# Patient Record
Sex: FEMALE | Race: WHITE | NOT HISPANIC OR LATINO | Employment: UNEMPLOYED | URBAN - NONMETROPOLITAN AREA
[De-identification: names, ages, dates, MRNs, and addresses within clinical notes are randomized per-mention and may not be internally consistent; named-entity substitution may affect disease eponyms.]

---

## 2021-02-09 ENCOUNTER — IMMUNIZATION (OUTPATIENT)
Dept: VACCINE CLINIC | Facility: HOSPITAL | Age: 66
End: 2021-02-09

## 2021-02-09 PROCEDURE — 0001A: CPT | Performed by: THORACIC SURGERY (CARDIOTHORACIC VASCULAR SURGERY)

## 2021-02-09 PROCEDURE — 91300 HC SARSCOV02 VAC 30MCG/0.3ML IM: CPT | Performed by: THORACIC SURGERY (CARDIOTHORACIC VASCULAR SURGERY)

## 2021-03-02 ENCOUNTER — IMMUNIZATION (OUTPATIENT)
Dept: VACCINE CLINIC | Facility: HOSPITAL | Age: 66
End: 2021-03-02

## 2021-03-02 PROCEDURE — 0002A: CPT | Performed by: THORACIC SURGERY (CARDIOTHORACIC VASCULAR SURGERY)

## 2021-03-02 PROCEDURE — 91300 HC SARSCOV02 VAC 30MCG/0.3ML IM: CPT | Performed by: THORACIC SURGERY (CARDIOTHORACIC VASCULAR SURGERY)

## 2024-08-02 ENCOUNTER — APPOINTMENT (OUTPATIENT)
Dept: GENERAL RADIOLOGY | Facility: HOSPITAL | Age: 69
End: 2024-08-02
Payer: MEDICARE

## 2024-08-02 ENCOUNTER — HOSPITAL ENCOUNTER (EMERGENCY)
Facility: HOSPITAL | Age: 69
Discharge: HOME | End: 2024-08-02
Attending: EMERGENCY MEDICINE
Payer: MEDICARE

## 2024-08-02 VITALS
TEMPERATURE: 98 F | HEIGHT: 65 IN | WEIGHT: 188 LBS | OXYGEN SATURATION: 92 % | BODY MASS INDEX: 31.32 KG/M2 | HEART RATE: 78 BPM | SYSTOLIC BLOOD PRESSURE: 104 MMHG | RESPIRATION RATE: 17 BRPM | DIASTOLIC BLOOD PRESSURE: 74 MMHG

## 2024-08-02 DIAGNOSIS — R42 VERTIGO: Primary | ICD-10-CM

## 2024-08-02 DIAGNOSIS — I48.20 CHRONIC ATRIAL FIBRILLATION: ICD-10-CM

## 2024-08-02 LAB
ALBUMIN SERPL-MCNC: 4.1 G/DL (ref 3.5–5.2)
ALBUMIN/GLOB SERPL: 1.2 G/DL
ALP SERPL-CCNC: 102 U/L (ref 39–117)
ALT SERPL W P-5'-P-CCNC: 13 U/L (ref 1–33)
ANION GAP SERPL CALCULATED.3IONS-SCNC: 10 MMOL/L (ref 5–15)
AST SERPL-CCNC: 22 U/L (ref 1–32)
BACTERIA UR QL AUTO: NORMAL /HPF
BASOPHILS # BLD AUTO: 0.07 10*3/MM3 (ref 0–0.2)
BASOPHILS NFR BLD AUTO: 1 % (ref 0–1.5)
BILIRUB SERPL-MCNC: 0.5 MG/DL (ref 0–1.2)
BILIRUB UR QL STRIP: NEGATIVE
BUN SERPL-MCNC: 17 MG/DL (ref 8–23)
BUN/CREAT SERPL: 26.6 (ref 7–25)
CALCIUM SPEC-SCNC: 9 MG/DL (ref 8.6–10.5)
CHLORIDE SERPL-SCNC: 103 MMOL/L (ref 98–107)
CLARITY UR: CLEAR
CO2 SERPL-SCNC: 28 MMOL/L (ref 22–29)
COLOR UR: YELLOW
CREAT SERPL-MCNC: 0.64 MG/DL (ref 0.57–1)
DEPRECATED RDW RBC AUTO: 45.9 FL (ref 37–54)
EGFRCR SERPLBLD CKD-EPI 2021: 94.6 ML/MIN/1.73
EOSINOPHIL # BLD AUTO: 0.15 10*3/MM3 (ref 0–0.4)
EOSINOPHIL NFR BLD AUTO: 2.2 % (ref 0.3–6.2)
ERYTHROCYTE [DISTWIDTH] IN BLOOD BY AUTOMATED COUNT: 13.5 % (ref 12.3–15.4)
GLOBULIN UR ELPH-MCNC: 3.3 GM/DL
GLUCOSE SERPL-MCNC: 117 MG/DL (ref 65–99)
GLUCOSE UR STRIP-MCNC: NEGATIVE MG/DL
HCT VFR BLD AUTO: 46.7 % (ref 34–46.6)
HGB BLD-MCNC: 15.2 G/DL (ref 12–15.9)
HGB UR QL STRIP.AUTO: NEGATIVE
HOLD SPECIMEN: NORMAL
HYALINE CASTS UR QL AUTO: NORMAL /LPF
IMM GRANULOCYTES # BLD AUTO: 0.02 10*3/MM3 (ref 0–0.05)
IMM GRANULOCYTES NFR BLD AUTO: 0.3 % (ref 0–0.5)
KETONES UR QL STRIP: NEGATIVE
LEUKOCYTE ESTERASE UR QL STRIP.AUTO: ABNORMAL
LYMPHOCYTES # BLD AUTO: 1.15 10*3/MM3 (ref 0.7–3.1)
LYMPHOCYTES NFR BLD AUTO: 17.2 % (ref 19.6–45.3)
MAGNESIUM SERPL-MCNC: 2.2 MG/DL (ref 1.6–2.4)
MCH RBC QN AUTO: 29.6 PG (ref 26.6–33)
MCHC RBC AUTO-ENTMCNC: 32.5 G/DL (ref 31.5–35.7)
MCV RBC AUTO: 90.9 FL (ref 79–97)
MONOCYTES # BLD AUTO: 0.44 10*3/MM3 (ref 0.1–0.9)
MONOCYTES NFR BLD AUTO: 6.6 % (ref 5–12)
NEUTROPHILS NFR BLD AUTO: 4.86 10*3/MM3 (ref 1.7–7)
NEUTROPHILS NFR BLD AUTO: 72.7 % (ref 42.7–76)
NITRITE UR QL STRIP: NEGATIVE
NRBC BLD AUTO-RTO: 0 /100 WBC (ref 0–0.2)
PH UR STRIP.AUTO: 6 [PH] (ref 5–8)
PLATELET # BLD AUTO: 311 10*3/MM3 (ref 140–450)
PMV BLD AUTO: 10.5 FL (ref 6–12)
POTASSIUM SERPL-SCNC: 3.7 MMOL/L (ref 3.5–5.2)
PROT SERPL-MCNC: 7.4 G/DL (ref 6–8.5)
PROT UR QL STRIP: NEGATIVE
QT INTERVAL: 360 MS
QTC INTERVAL: 438 MS
RBC # BLD AUTO: 5.14 10*6/MM3 (ref 3.77–5.28)
RBC # UR STRIP: NORMAL /HPF
REF LAB TEST METHOD: NORMAL
SODIUM SERPL-SCNC: 141 MMOL/L (ref 136–145)
SP GR UR STRIP: 1.02 (ref 1–1.03)
SQUAMOUS #/AREA URNS HPF: NORMAL /HPF
TROPONIN T SERPL HS-MCNC: <6 NG/L
TROPONIN T SERPL HS-MCNC: <6 NG/L
UROBILINOGEN UR QL STRIP: ABNORMAL
WBC # UR STRIP: NORMAL /HPF
WBC NRBC COR # BLD AUTO: 6.69 10*3/MM3 (ref 3.4–10.8)
WHOLE BLOOD HOLD COAG: NORMAL
WHOLE BLOOD HOLD SPECIMEN: NORMAL

## 2024-08-02 PROCEDURE — 36415 COLL VENOUS BLD VENIPUNCTURE: CPT

## 2024-08-02 PROCEDURE — 84484 ASSAY OF TROPONIN QUANT: CPT | Performed by: EMERGENCY MEDICINE

## 2024-08-02 PROCEDURE — 80053 COMPREHEN METABOLIC PANEL: CPT | Performed by: EMERGENCY MEDICINE

## 2024-08-02 PROCEDURE — 99284 EMERGENCY DEPT VISIT MOD MDM: CPT

## 2024-08-02 PROCEDURE — 85025 COMPLETE CBC W/AUTO DIFF WBC: CPT | Performed by: EMERGENCY MEDICINE

## 2024-08-02 PROCEDURE — 81001 URINALYSIS AUTO W/SCOPE: CPT | Performed by: EMERGENCY MEDICINE

## 2024-08-02 PROCEDURE — 93005 ELECTROCARDIOGRAM TRACING: CPT | Performed by: EMERGENCY MEDICINE

## 2024-08-02 PROCEDURE — 71045 X-RAY EXAM CHEST 1 VIEW: CPT

## 2024-08-02 PROCEDURE — 83735 ASSAY OF MAGNESIUM: CPT | Performed by: EMERGENCY MEDICINE

## 2024-08-02 RX ORDER — MELOXICAM 15 MG/1
15 TABLET ORAL DAILY
Qty: 10 TABLET | Refills: 0 | Status: SHIPPED | OUTPATIENT
Start: 2024-08-02

## 2024-08-02 RX ORDER — ACETAMINOPHEN 500 MG
1000 TABLET ORAL EVERY 6 HOURS PRN
Qty: 30 TABLET | Refills: 0 | Status: SHIPPED | OUTPATIENT
Start: 2024-08-02

## 2024-08-02 RX ORDER — SODIUM CHLORIDE 0.9 % (FLUSH) 0.9 %
10 SYRINGE (ML) INJECTION AS NEEDED
Status: DISCONTINUED | OUTPATIENT
Start: 2024-08-02 | End: 2024-08-02 | Stop reason: HOSPADM

## 2024-08-02 NOTE — ED PROVIDER NOTES
"Subjective   History of Present Illness  Patient is a 69-year-old female presenting to the emergency department with an episode of significant vertigo with some chest tightness and palpitations.  Patient reports she has a history of atrial fibrillation and is currently on Eliquis and reports she is compliant with the medication.  She reports she went and saw her cardiologist last week and they advised that she was \"out of rhythm\".  Patient wore a monitor and returned it yesterday.  Patient reports that yesterday she noted that she was in A-fib.  Patient took her medications with improvement in the symptoms.  Patient reports about 7 AM this morning she got up to use the restroom and had \"severe vertigo\".  She reports that she had chest tightness and palpitations at that time as well.  Patient reports the dizziness has improved significantly.    History provided by:  Patient and spouse      Review of Systems    No past medical history on file.    Allergies   Allergen Reactions    Novocain [Procaine] Swelling       No past surgical history on file.    No family history on file.    Social History     Socioeconomic History    Marital status:            Objective   Physical Exam  Vitals and nursing note reviewed.   Constitutional:       General: She is not in acute distress.     Appearance: Normal appearance. She is obese. She is toxic-appearing.   Cardiovascular:      Rate and Rhythm: Normal rate. Rhythm irregularly irregular.   Pulmonary:      Effort: Pulmonary effort is normal. No respiratory distress.      Breath sounds: Normal breath sounds.   Abdominal:      Palpations: Abdomen is soft.   Musculoskeletal:         General: Normal range of motion.      Right lower leg: No edema.      Left lower leg: No edema.   Neurological:      General: No focal deficit present.      Mental Status: She is alert and oriented to person, place, and time.      Comments: HINTS exam not suggestive of central etiology. No unilateral " deficit.    Psychiatric:         Mood and Affect: Mood normal.         Behavior: Behavior normal.         Procedures           ED Course  ED Course as of 08/02/24 1853   Fri Aug 02, 2024   1037 XR Chest 1 View  I personally reviewed single view the chest.  My interpretation there is no focal lobar infiltrate visualized [RS]   1039 I did review the cardiology visit note from July 16.  It is noted at that point that the patient was in paroxysmal atrial fibrillation with irregularly irregular rate and rhythm.  Please see that note for further details. [RS]   1126 Single High Sensitivity Troponin T  Troponin is negative. [RS]   1127 Cardiology paged. [RS]   1155 I reviewed the case with cardiology.  He feels that if the patient has been in atrial fibrillation with a controlled rate at this point, he would not recommend cardioversion at this time.  He would continue the evaluation and plan for discharge with follow-up with her cardiologist. [RS]   1456 Patient is resting comfortably.  I once again reviewed the second EKG with cardiology and they continue to recommend the plan.  I talked with the patient and she would like to establish with our A-fib clinic and cardiology.  Patient with findings consistent with chronic atrial fibrillation with episode of vertigo which could be related to the atrial fibrillation or be unrelated.  Patient symptoms have resolved.  No stroke deficit appreciated. I have reviewed results, considerations, and diagnosis with the patient and/or their representative. Anticipatory guidance provided. Follow-up plan reviewed. Precautions for acute return for re-evaluations also reviewed. This including potential for worsening of the presenting condition and need for further evaluation, admission, and/or intervention as indicated. Opportunity to as questions provided. I advised them to return for any concerns and stressed the importance of timely follow-up and outpatient services. They verbalized  understanding.   [RS]   0396 Note to patient: The 21st Century Cures Act makes medical notes like these available to patients in the interest of transparency. However, be advised this is a medical document. It is intended as peer to peer communication. It is written in medical language and may contain abbreviations or verbiage that are unfamiliar. It may appear blunt or direct. Medical documents are intended to carry relevant information, facts as evident, and the clinical opinion of the physician/NPP.   [RS]      ED Course User Index  [RS] Darshan Pritchard MD                                             Medical Decision Making  Problems Addressed:  Chronic atrial fibrillation: complicated acute illness or injury  Vertigo: complicated acute illness or injury    Amount and/or Complexity of Data Reviewed  Independent Historian: spouse and EMS  Labs: ordered. Decision-making details documented in ED Course.  Radiology: ordered. Decision-making details documented in ED Course.  ECG/medicine tests: ordered.    Risk  OTC drugs.  Prescription drug management.        Final diagnoses:   Vertigo   Chronic atrial fibrillation       ED Disposition  ED Disposition       ED Disposition   Discharge    Condition   Stable    Comment   --               [facility]  [address]  [place] [address]  [phone]  Schedule an appointment as soon as possible for a visit   As soon as possible.    Keerthi Koenig, PA  [address] [place] [address]   [address]  [facility] [address] [phone]          [facility] EMERGENCY DEPARTMENT  [address] [facility] [id number] [phone]    As needed, If symptoms worsen or ANY concerns.    [facility]  [address] [facility]  [address]  [facility] [id number] [phone]             Medication List        New Prescriptions      acetaminophen 500 MG tablet  Commonly known as: TYLENOL  Take 2 tablets  by mouth Every 6 (Six) Hours As Needed for Mild Pain or Moderate Pain.     meloxicam 15 MG tablet  Commonly known as: MOBIC  Take 1 tablet by mouth Daily.               Where to Get Your Medications        These medications were sent to [facility] [id number] - [place] - [address] - [phone]  - [phone] FX  [address] [place] [address]      Phone: [phone]   acetaminophen 500 MG tablet  meloxicam 15 MG tablet            Darshan Pritchard MD  08/02/24 5722

## 2024-08-05 LAB
QT INTERVAL: 394 MS
QTC INTERVAL: 451 MS

## 2024-08-08 ENCOUNTER — OFFICE VISIT (OUTPATIENT)
Dept: CARDIOLOGY | Facility: HOSPITAL | Age: 69
End: 2024-08-08
Payer: MEDICARE

## 2024-08-08 VITALS
DIASTOLIC BLOOD PRESSURE: 62 MMHG | RESPIRATION RATE: 16 BRPM | BODY MASS INDEX: 30.19 KG/M2 | SYSTOLIC BLOOD PRESSURE: 128 MMHG | TEMPERATURE: 97.3 F | HEIGHT: 65 IN | WEIGHT: 181.2 LBS | HEART RATE: 62 BPM | OXYGEN SATURATION: 94 %

## 2024-08-08 DIAGNOSIS — I48.0 AF (PAROXYSMAL ATRIAL FIBRILLATION): Primary | ICD-10-CM

## 2024-08-08 DIAGNOSIS — I10 PRIMARY HYPERTENSION: ICD-10-CM

## 2024-08-08 RX ORDER — DIPHENOXYLATE HYDROCHLORIDE AND ATROPINE SULFATE 2.5; .025 MG/1; MG/1
1 TABLET ORAL DAILY
COMMUNITY

## 2024-08-08 RX ORDER — UBIDECARENONE 200 MG
200 CAPSULE ORAL DAILY
COMMUNITY

## 2024-08-08 RX ORDER — FLECAINIDE ACETATE 100 MG/1
100 TABLET ORAL EVERY 12 HOURS
COMMUNITY
Start: 2024-03-07 | End: 2024-09-03

## 2024-08-08 RX ORDER — SIMVASTATIN 20 MG
1 TABLET ORAL DAILY
COMMUNITY

## 2024-08-08 RX ORDER — MONTELUKAST SODIUM 10 MG/1
1 TABLET ORAL DAILY
COMMUNITY

## 2024-08-08 RX ORDER — HYDROCHLOROTHIAZIDE 12.5 MG/1
1 TABLET ORAL DAILY
COMMUNITY

## 2024-08-08 RX ORDER — CETIRIZINE HYDROCHLORIDE 10 MG/1
CAPSULE, LIQUID FILLED ORAL DAILY
COMMUNITY

## 2024-08-08 RX ORDER — APIXABAN 5 MG/1
1 TABLET, FILM COATED ORAL 2 TIMES DAILY
COMMUNITY

## 2024-08-08 NOTE — PATIENT INSTRUCTIONS
- Office will schedule testing  - Office will call or send message with testing results    - Dr. Miller's department will call for appointment    - Research atrial fibrillation ablation

## 2024-08-08 NOTE — PROGRESS NOTES
Chief Complaint  Atrial Fibrillation    Subjective      History of Present Illness {  Problem List  Visit  Diagnosis   Encounters  Notes  Medications  Labs  Result Review Imaging  Media :23}     Kailey Nguyen, 69 y.o. female with paroxysmal atrial fibrillation, HTN, HLD, vasculitis, asthma presents to [facility] for Atrial Fibrillation.    Patient was recently evaluated at [facility] emergency department for complaints of vertigo, as well as chest tightness and palpitations.  Emergency department work-up revealed normal troponin, CXR with no acute cardiopulmonary findings, and ECGs with no evidence of acute coronary syndrome. Patient was instructed to follow-up at [facility] heart and valve clinic for further evaluation.  Patient recently had an appointment with  electrophysiology and was placed on a 2-week monitor with plans for potential cardioversion if atrial fibrillation was persistent.    Care everywhere results indicate Holter monitor was completed for approximately 14 days.  Atrial fibrillation/flutter burden 22%.  Average heart rate 62.  PACs/PVC burden less than 1%.    Patient presents today with no recurrent dizziness since ED evaluation. She experienced severe dizziness and violent loss of balance after getting out of bed in the morning; no syncope or falls. Did not feel like she was in atrial fibrillation at that time. Reports she is generally aware of her atrial fibrillation with symptoms of fatigue/shortness of breath/chest pressure. No recent illness, but has been struggling with allergy/sinus issues lately. Denies anginal symptoms. Does report intermittent vasovagal symptoms; recently increased hydration. Currently maintained on metoprolol and flecainide.       Objective     Vital Signs:   Vitals:    08/08/24 0820 08/08/24 0821   BP: 134/74 128/62   BP Location: Left arm Left arm   Patient Position: Standing Sitting   Cuff Size: Adult Adult  "  Pulse: 70 62   Resp:  16   Temp: 97.3 °F (36.3 °C) 97.3 °F (36.3 °C)   TempSrc: Temporal Temporal   SpO2: 95% 94%   Weight:  82.2 kg (181 lb 3.2 oz)   Height:  165.1 cm (65\")     Body mass index is 30.15 kg/m².  Physical Exam  Vitals and nursing note reviewed.   Constitutional:       Appearance: Normal appearance.   HENT:      Head: Normocephalic.   Eyes:      Extraocular Movements: Extraocular movements intact.   Neck:      Vascular: No carotid bruit.   Cardiovascular:      Rate and Rhythm: Normal rate and regular rhythm.      Pulses: Normal pulses.      Heart sounds: Normal heart sounds, S1 normal and S2 normal. No murmur heard.  Pulmonary:      Effort: Pulmonary effort is normal. No respiratory distress.      Breath sounds: Normal breath sounds.   Musculoskeletal:      Cervical back: Neck supple.      Right lower leg: No edema.      Left lower leg: No edema.   Skin:     General: Skin is warm and dry.   Neurological:      General: No focal deficit present.      Mental Status: She is alert.   Psychiatric:         Mood and Affect: Mood normal.         Behavior: Behavior normal.         Thought Content: Thought content normal.        Data Reviewed:{ Labs  Result Review  Imaging  Med Tab  Media :23}     Single High Sensitivity Troponin T (08/02/2024 13:11)  Magnesium (08/02/2024 10:36)  Single High Sensitivity Troponin T (08/02/2024 10:36)  Comprehensive Metabolic Panel (08/02/2024 10:36)  CBC & Differential (08/02/2024 10:36)  XR Chest 1 View (08/02/2024 10:28)   ECG 12 Lead Rhythm Change (08/02/2024 13:09)  ECG 12 Lead ED Triage Standing Order; Weak / Dizzy / AMS (08/02/2024 10:08)    Holter Monitor - 72 Hour Up To 15 Days (07/16/2024 11:49)   Adult Transthoracic Echo Complete W/ Cont if Necessary Per Protocol (07/10/2018 11:38)   CT Angiogram Neck (10/30/2018 16:20)       Assessment & Plan   Assessment and Plan {CC Problem List  Visit Diagnosis  ROS  Review (Popup)  Health Maintenance  Quality  " BestPractice  Medications  SmartSets  SnapShot Encounters  Media :23}     1. AF (paroxysmal atrial fibrillation)  - History of paroxysmal AF dating back 2019  -Recent ED evaluation with pronounced dizziness, found in rate controlled atrial flutter.  -Recent Holter monitor at  completed for approximately 14 days.  Atrial fibrillation/flutter burden 22%.  Average heart rate 62.  PACs/PVC burden less than 1%.  -Regular rate/rhythm at time of exam. Recenty ECG with stable QRS on flecainide  -[id number]: 3 (age, female, HTN)  - Adult Transthoracic Echo Complete W/ Cont if Necessary Per Protocol; Future  -Continue flecainide/metoprolol for now. Discussed may take additional metoprolol dose for elevated heart rate/symptoms of AF.   -Lengthy discussion on antiarrhythmic transition versus ablation.  She will research and think about options before meeting up with [facility].  -Would like to transition to [facility] as her primary EP at  is leaving [place]  -Referral to [facility]    2. Primary hypertension  -Well-controlled today  -Continue current medication regimen  - Adult Transthoracic Echo Complete W/ Cont if Necessary Per Protocol; Future      Follow Up {Instructions Charge Capture  Follow-up Communications :23}     Return if symptoms worsen or fail to improve.      Patient was given instructions and counseling regarding her condition or for health maintenance advice. Please see specific information pulled into the AVS if appropriate.  Patient was instructed to call the [facility] with any questions, concerns, or worsening symptoms.    Dictated Utilizing Dragon Dictation   Please note that portions of this note were completed with a voice recognition program.   Part of this note may be an electronic transcription/translation of spoken language to printed text using the Dragon Dictation System.

## 2024-08-09 ENCOUNTER — HOSPITAL ENCOUNTER (OUTPATIENT)
Facility: HOSPITAL | Age: 69
Discharge: HOME | End: 2024-08-09
Payer: MEDICARE

## 2024-08-09 VITALS — HEIGHT: 65 IN | WEIGHT: 181.22 LBS | BODY MASS INDEX: 30.19 KG/M2

## 2024-08-09 DIAGNOSIS — I10 PRIMARY HYPERTENSION: ICD-10-CM

## 2024-08-09 DIAGNOSIS — I48.0 AF (PAROXYSMAL ATRIAL FIBRILLATION): ICD-10-CM

## 2024-08-09 LAB
ASCENDING AORTA: 3.2 CM
BH CV ECHO MEAS - AO MAX PG: 5.8 MMHG
BH CV ECHO MEAS - AO MEAN PG: 3 MMHG
BH CV ECHO MEAS - AO ROOT DIAM: 3.5 CM
BH CV ECHO MEAS - AO V2 MAX: 120.3 CM/SEC
BH CV ECHO MEAS - AO V2 VTI: 30 CM
BH CV ECHO MEAS - AVA(I,D): 1.87 CM2
BH CV ECHO MEAS - EDV(CUBED): 74.1 ML
BH CV ECHO MEAS - EDV(MOD-SP2): 68 ML
BH CV ECHO MEAS - EDV(MOD-SP4): 60.6 ML
BH CV ECHO MEAS - EF(MOD-BP): 59.6 %
BH CV ECHO MEAS - EF(MOD-SP2): 63.7 %
BH CV ECHO MEAS - EF(MOD-SP4): 55 %
BH CV ECHO MEAS - ESV(CUBED): 29.8 ML
BH CV ECHO MEAS - ESV(MOD-SP2): 24.7 ML
BH CV ECHO MEAS - ESV(MOD-SP4): 27.3 ML
BH CV ECHO MEAS - FS: 26.2 %
BH CV ECHO MEAS - IVS/LVPW: 0.78 CM
BH CV ECHO MEAS - IVSD: 0.7 CM
BH CV ECHO MEAS - LA DIMENSION: 3.4 CM
BH CV ECHO MEAS - LAT PEAK E' VEL: 15.3 CM/SEC
BH CV ECHO MEAS - LV DIASTOLIC VOL/BSA (35-75): 32 CM2
BH CV ECHO MEAS - LV MASS(C)D: 101.3 GRAMS
BH CV ECHO MEAS - LV MAX PG: 1.47 MMHG
BH CV ECHO MEAS - LV MEAN PG: 1 MMHG
BH CV ECHO MEAS - LV SYSTOLIC VOL/BSA (12-30): 14.4 CM2
BH CV ECHO MEAS - LV V1 MAX: 60.7 CM/SEC
BH CV ECHO MEAS - LV V1 VTI: 17.9 CM
BH CV ECHO MEAS - LVIDD: 4.2 CM
BH CV ECHO MEAS - LVIDS: 3.1 CM
BH CV ECHO MEAS - LVOT AREA: 3.1 CM2
BH CV ECHO MEAS - LVOT DIAM: 2 CM
BH CV ECHO MEAS - LVPWD: 0.9 CM
BH CV ECHO MEAS - MED PEAK E' VEL: 9.9 CM/SEC
BH CV ECHO MEAS - MV A MAX VEL: 38.2 CM/SEC
BH CV ECHO MEAS - MV DEC SLOPE: 457 CM/SEC2
BH CV ECHO MEAS - MV DEC TIME: 0.19 SEC
BH CV ECHO MEAS - MV E MAX VEL: 84.9 CM/SEC
BH CV ECHO MEAS - MV E/A: 2.22
BH CV ECHO MEAS - MV MAX PG: 3.5 MMHG
BH CV ECHO MEAS - MV MEAN PG: 1 MMHG
BH CV ECHO MEAS - MV V2 VTI: 32.6 CM
BH CV ECHO MEAS - MVA(VTI): 1.72 CM2
BH CV ECHO MEAS - PA ACC TIME: 0.12 SEC
BH CV ECHO MEAS - PA V2 MAX: 71.1 CM/SEC
BH CV ECHO MEAS - RAP SYSTOLE: 3 MMHG
BH CV ECHO MEAS - RVSP: 17 MMHG
BH CV ECHO MEAS - SV(LVOT): 56.2 ML
BH CV ECHO MEAS - SV(MOD-SP2): 43.3 ML
BH CV ECHO MEAS - SV(MOD-SP4): 33.3 ML
BH CV ECHO MEAS - SVI(LVOT): 29.7 ML/M2
BH CV ECHO MEAS - SVI(MOD-SP2): 22.8 ML/M2
BH CV ECHO MEAS - SVI(MOD-SP4): 17.6 ML/M2
BH CV ECHO MEAS - TAPSE (>1.6): 1.88 CM
BH CV ECHO MEAS - TR MAX PG: 14 MMHG
BH CV ECHO MEAS - TR MAX VEL: 186.5 CM/SEC
BH CV ECHO MEASUREMENTS AVERAGE E/E' RATIO: 6.74
BH CV VAS BP LEFT ARM: NORMAL MMHG
BH CV XLRA - RV BASE: 2.9 CM
BH CV XLRA - RV LENGTH: 7.9 CM
BH CV XLRA - RV MID: 2.4 CM
BH CV XLRA - TDI S': 14.7 CM/SEC
IVRT: 81 MS
LEFT ATRIUM VOLUME INDEX: 26.8 ML/M2

## 2024-08-09 PROCEDURE — 93306 TTE W/DOPPLER COMPLETE: CPT | Performed by: INTERNAL MEDICINE

## 2024-08-09 PROCEDURE — 93306 TTE W/DOPPLER COMPLETE: CPT

## 2024-08-14 PROBLEM — I10 ESSENTIAL HYPERTENSION: Status: ACTIVE | Noted: 2024-08-14

## 2024-08-14 PROBLEM — I48.0 PAROXYSMAL ATRIAL FIBRILLATION: Status: ACTIVE | Noted: 2024-08-14

## 2024-08-14 NOTE — H&P (VIEW-ONLY)
Electrophysiology Clinic Consult     Kailey Nguyen  [id number]  1955    Referring Provider: Jean-Claude Nguyen APRN   PCP: Keerthi Koenig PA  [address] [place] [address] [address] [place] [address] [place] [id number]    Date of Service: 08/15/24    Chief Complaint   Patient presents with    AF (paroxysmal atrial fibrillation)     Problem List  Atrial fibrillation  [id number] (age, sex, HTN)  AAD: Flecainide and metoprolol  Monitor, 8/2024: A-fib/a flutter burden 22%  Echo, 8/2024: EF 56-60%  Hypertension  Hyperlipidemia   Asthma  GERD  Hiatal hernia      History of Present Illness  Kailey Nguyen is a 69 y.o. female who presents to my electrophysiology clinic for evaluation of AF/HTN. Patient is transferring care from Dr. Vela at . Patient was seen in July and was found to be out of rhythm. Patient was unaware, but stated she had been more fatigued. Two week monitor was completed which showed afib burden 22%. Interested in learning more about AF ablation.     Review of Systems   Constitutional:  Positive for fatigue. Negative for activity change and fever.   Respiratory:  Positive for shortness of breath. Negative for chest tightness.    Cardiovascular:  Negative for chest pain, palpitations and leg swelling.   Gastrointestinal:  Negative for constipation and diarrhea.   Genitourinary:  Negative for decreased urine volume and difficulty urinating.   Skin:  Negative for wound.   Neurological:  Positive for weakness. Negative for dizziness, syncope and light-headedness.   Psychiatric/Behavioral:  Negative for suicidal ideas.        Outpatient Medications Marked as Taking for the 8/15/24 encounter (Office Visit) with Ko Miller MD   Medication Sig Dispense Refill    acetaminophen (TYLENOL) 500 MG tablet Take 2 tablets by mouth Every 6 (Six) Hours As Needed for Mild Pain or Moderate Pain. 30 tablet 0    Cetirizine HCl (ZyrTEC Allergy) 10 MG capsule Take  by mouth Daily.      Coenzyme Q10 200 MG capsule Take 200 mg by mouth  "Daily.      Eliquis 5 MG tablet tablet Take 1 tablet by mouth 2 (Two) Times a Day.      esomeprazole (nexIUM) 20 MG capsule Take 1 capsule by mouth Daily.      flecainide (TAMBOCOR) 100 MG tablet Take 1 tablet by mouth Every 12 (Twelve) Hours.      hydroCHLOROthiazide 12.5 MG tablet Take 1 tablet by mouth Daily.      metoprolol tartrate (LOPRESSOR) 25 MG tablet Take 0.5 tablets by mouth 2 (Two) Times a Day.      montelukast (SINGULAIR) 10 MG tablet Take 1 tablet by mouth Daily.      multivitamin tablet tablet Take 1 tablet by mouth Daily.      simvastatin (ZOCOR) 20 MG tablet Take 1 tablet by mouth Daily.         Physical Exam  Vitals:    08/15/24 1125   BP: 112/64   BP Location: Left arm   Patient Position: Sitting   Pulse: (!) 45   SpO2: 95%   Weight: 85.3 kg (188 lb)   Height: 165.1 cm (65\")     Body mass index is 31.28 kg/m².  GENERAL: Well-developed, well-nourished patient in no acute distress.  HEENT: NC, AC, PERRLA. MMM  NECK: No JVD. No carotid bruits auscultated.  LUNGS: Clear to auscultation bilaterally.  CARDIOVASCULAR: RRR No murmurs, gallops or rubs noted.   ABDOMEN: Soft, nontender. Positive bowel sounds.  MUSCULOSKELETAL: No gross deformities. No clubbing, cyanosis  EXT: pulses intact, No edema  SKIN: Pink, warm  Neuro: Nonfocal exam. Gait intact    Diagnostic Data  Procedures    Lab Results   Component Value Date    GLUCOSE 117 (H) 08/02/2024    CALCIUM 9.0 08/02/2024     08/02/2024    K 3.7 08/02/2024    CO2 28.0 08/02/2024     08/02/2024    BUN 17 08/02/2024    CREATININE 0.64 08/02/2024    BCR 26.6 (H) 08/02/2024    ANIONGAP 10.0 08/02/2024     Lab Results   Component Value Date    WBC 6.69 08/02/2024    HGB 15.2 08/02/2024    HCT 46.7 (H) 08/02/2024    MCV 90.9 08/02/2024     08/02/2024     No results found for: \"INR\", \"PROTIME\"  No results found for: \"TSH\", \"R4LQKEE\", \"S5KHXZR\", \"THYROIDAB\"      I personally viewed and interpreted the patient's EKG/Telemetry/lab data    Kailey " ROGELIO Nguyen  reports that she has never smoked. She has never been exposed to tobacco smoke. She has never used smokeless tobacco. I have educated her on the risk of diseases from using tobacco products such as cancer, COPD, and heart disease.       I spent 3  minutes counseling the patient.           ACP discussion was declined by the patient. Patient does not have an advance directive, declines further assistance.    Assessment and Plan   Diagnoses and all orders for this visit:    1. Paroxysmal atrial fibrillation (Primary)    2. Essential hypertension      Paroxysmal atrial fibrillation  -[id number] 3 (age, sex, HTN), anticoagulated with Eliquis  -Currently on flecainide and metoprolol. QRS stable on EKG  -Recent monitor showed afib burden 22%  -After extensive conversation regarding risks, benefits, or alternatives to the therapy, patient elected to proceed with the AF ablation procedure   - AF ablation with PFA/ARMANDO   - continue current meds   - CTA prior    Hypertension  -Well-controlled, continue current medication regimen      Follow Up  Return for Follow up after Procedure.      Thank you for allowing me to participate in the care of your patient. Please to not hesitate to contact me with additional questions or concerns.        Ko Miller MD [facility]  Cardiac Electrophysiologist  [facility] / [facility]

## 2024-08-14 NOTE — PROGRESS NOTES
Electrophysiology Clinic Consult     Kailey Nguyen  [id number]  1955    Referring Provider: Jean-Claude Nguyen APRN   PCP: Keerthi Koenig PA  [address] [place] [address] [address] [place] [address] [place] [id number]    Date of Service: 08/15/24    Chief Complaint   Patient presents with    AF (paroxysmal atrial fibrillation)     Problem List  Atrial fibrillation  [id number] (age, sex, HTN)  AAD: Flecainide and metoprolol  Monitor, 8/2024: A-fib/a flutter burden 22%  Echo, 8/2024: EF 56-60%  Hypertension  Hyperlipidemia   Asthma  GERD  Hiatal hernia      History of Present Illness  Kailey Nguyen is a 69 y.o. female who presents to my electrophysiology clinic for evaluation of AF/HTN. Patient is transferring care from Dr. Vela at . Patient was seen in July and was found to be out of rhythm. Patient was unaware, but stated she had been more fatigued. Two week monitor was completed which showed afib burden 22%. Interested in learning more about AF ablation.     Review of Systems   Constitutional:  Positive for fatigue. Negative for activity change and fever.   Respiratory:  Positive for shortness of breath. Negative for chest tightness.    Cardiovascular:  Negative for chest pain, palpitations and leg swelling.   Gastrointestinal:  Negative for constipation and diarrhea.   Genitourinary:  Negative for decreased urine volume and difficulty urinating.   Skin:  Negative for wound.   Neurological:  Positive for weakness. Negative for dizziness, syncope and light-headedness.   Psychiatric/Behavioral:  Negative for suicidal ideas.        Outpatient Medications Marked as Taking for the 8/15/24 encounter (Office Visit) with Ko iMller MD   Medication Sig Dispense Refill    acetaminophen (TYLENOL) 500 MG tablet Take 2 tablets by mouth Every 6 (Six) Hours As Needed for Mild Pain or Moderate Pain. 30 tablet 0    Cetirizine HCl (ZyrTEC Allergy) 10 MG capsule Take  by mouth Daily.      Coenzyme Q10 200 MG capsule Take 200 mg by mouth  "Daily.      Eliquis 5 MG tablet tablet Take 1 tablet by mouth 2 (Two) Times a Day.      esomeprazole (nexIUM) 20 MG capsule Take 1 capsule by mouth Daily.      flecainide (TAMBOCOR) 100 MG tablet Take 1 tablet by mouth Every 12 (Twelve) Hours.      hydroCHLOROthiazide 12.5 MG tablet Take 1 tablet by mouth Daily.      metoprolol tartrate (LOPRESSOR) 25 MG tablet Take 0.5 tablets by mouth 2 (Two) Times a Day.      montelukast (SINGULAIR) 10 MG tablet Take 1 tablet by mouth Daily.      multivitamin tablet tablet Take 1 tablet by mouth Daily.      simvastatin (ZOCOR) 20 MG tablet Take 1 tablet by mouth Daily.         Physical Exam  Vitals:    08/15/24 1125   BP: 112/64   BP Location: Left arm   Patient Position: Sitting   Pulse: (!) 45   SpO2: 95%   Weight: 85.3 kg (188 lb)   Height: 165.1 cm (65\")     Body mass index is 31.28 kg/m².  GENERAL: Well-developed, well-nourished patient in no acute distress.  HEENT: NC, AC, PERRLA. MMM  NECK: No JVD. No carotid bruits auscultated.  LUNGS: Clear to auscultation bilaterally.  CARDIOVASCULAR: RRR No murmurs, gallops or rubs noted.   ABDOMEN: Soft, nontender. Positive bowel sounds.  MUSCULOSKELETAL: No gross deformities. No clubbing, cyanosis  EXT: pulses intact, No edema  SKIN: Pink, warm  Neuro: Nonfocal exam. Gait intact    Diagnostic Data  Procedures    Lab Results   Component Value Date    GLUCOSE 117 (H) 08/02/2024    CALCIUM 9.0 08/02/2024     08/02/2024    K 3.7 08/02/2024    CO2 28.0 08/02/2024     08/02/2024    BUN 17 08/02/2024    CREATININE 0.64 08/02/2024    BCR 26.6 (H) 08/02/2024    ANIONGAP 10.0 08/02/2024     Lab Results   Component Value Date    WBC 6.69 08/02/2024    HGB 15.2 08/02/2024    HCT 46.7 (H) 08/02/2024    MCV 90.9 08/02/2024     08/02/2024     No results found for: \"INR\", \"PROTIME\"  No results found for: \"TSH\", \"P3PRSVP\", \"B3XTCVC\", \"THYROIDAB\"      I personally viewed and interpreted the patient's EKG/Telemetry/lab data    Kailey " ROGELIO Nguyen  reports that she has never smoked. She has never been exposed to tobacco smoke. She has never used smokeless tobacco. I have educated her on the risk of diseases from using tobacco products such as cancer, COPD, and heart disease.       I spent 3  minutes counseling the patient.           ACP discussion was declined by the patient. Patient does not have an advance directive, declines further assistance.    Assessment and Plan   Diagnoses and all orders for this visit:    1. Paroxysmal atrial fibrillation (Primary)    2. Essential hypertension      Paroxysmal atrial fibrillation  -[id number] 3 (age, sex, HTN), anticoagulated with Eliquis  -Currently on flecainide and metoprolol. QRS stable on EKG  -Recent monitor showed afib burden 22%  -After extensive conversation regarding risks, benefits, or alternatives to the therapy, patient elected to proceed with the AF ablation procedure   - AF ablation with PFA/ARMANDO   - continue current meds   - CTA prior    Hypertension  -Well-controlled, continue current medication regimen      Follow Up  Return for Follow up after Procedure.      Thank you for allowing me to participate in the care of your patient. Please to not hesitate to contact me with additional questions or concerns.        Ko Miller MD [facility]  Cardiac Electrophysiologist  [facility] / [facility]

## 2024-08-15 ENCOUNTER — OFFICE VISIT (OUTPATIENT)
Dept: CARDIOLOGY | Facility: CLINIC | Age: 69
End: 2024-08-15
Payer: MEDICARE

## 2024-08-15 VITALS
WEIGHT: 188 LBS | HEART RATE: 45 BPM | OXYGEN SATURATION: 95 % | HEIGHT: 65 IN | SYSTOLIC BLOOD PRESSURE: 112 MMHG | DIASTOLIC BLOOD PRESSURE: 64 MMHG | BODY MASS INDEX: 31.32 KG/M2

## 2024-08-15 DIAGNOSIS — I10 ESSENTIAL HYPERTENSION: Chronic | ICD-10-CM

## 2024-08-15 DIAGNOSIS — I48.0 PAROXYSMAL ATRIAL FIBRILLATION: Primary | Chronic | ICD-10-CM

## 2024-08-15 DIAGNOSIS — I48.0 PAROXYSMAL ATRIAL FIBRILLATION: Primary | ICD-10-CM

## 2024-08-15 NOTE — NURSING NOTE
" PRE-PVA ASSESSMENT  Kailey Nguyen 1955   [address] [facility] [address]   [phone]  There is no work phone number on file.      Referral Source: Jean-Claude Nguyen APRN   Information obtained from: [x] Medical record review  [x] Patient report  Scheduled for: PVA w/ PFA on September 4, 2024 with Dr. Miller  Allergies   Allergen Reactions    Lidocaine Swelling    Novocain [Procaine] Swelling    Latex Rash and Unknown (See Comments)       AFib Specific History:  AFIBTYPE: paroxysmal    CHADS-VASc Risk Assessment               3 Total Score    1 Hypertension    1 Age 65-74    1 Sex: Female    Criteria that do not apply:    CHF    Age >/= 75    DM    PRIOR STROKE/TIA/THROMBO    Vascular Disease            Anticoagulation: Eliquis 5 mg BID no missed doses  Cardioversion x 0  Failed AAD(s): Flecainide   Prior Ablation: None    Is Ms. Nguyen aware of her AFib? Yes   Onset: 2019    Exacerbations: No   Frequency: 22%   Alleviations: taking med    Duration: 22%     Symptoms:   [] Palpitations:    [x] Chest Discomfort: Weight on chest    [] Dizziness:    [] Presyncope:    [] Lightheadedness:   [] Syncope:    [x] Fatigue:    [] Other:     [x] Short of Breath:     Last Echo(s):  [x] TTE Date: 8/9/2024             EF: 56-60%            LA: 3.4cm          VHD-Trace TR        Past medical History:   [] Diabetes - No                 No results found for: \"HGBA1C\"       [] HYPOthyroidism  [] HYPERthyroidism      PAT will obtain    [x] HTN        [x] Tx- Hydroclorothiazide, Lopressor           [] Heart Failure-No    [] CVA -No                              [] TIA-No         [] Ischemic         [] Hemorrhagic         [] Nonischemic         [] Embolic        [] Diastolic    [] CAD -No        [] MI -No           [x] Dyslipidemia- Zocor  [] Statin indicated    [] Ischemic Evaluation       [] Stress Test: No       [] Heart Cath: No    [x] Sleep Apnea Suspected        [x] Discussed with Ms. Nguyen         [x] Referral to Sleep " med       [] Declined by Ms. Nguyen     [x] Obesity       [x] BMI 30-35 (31.28)        [] BMI >35         [x] Weight reduction discussed with Ms. Nguyen         [] Nutrition Consult            [x] Declined by Ms. Nguyen     Other Pertinent PMH: cough, GERD    Summary of Patient Contact: I spoke with Ms. Nguyen about her upcoming PVA.   She was well informed about the procedure from prior discussion with Dr. Miller and from reading the provided literature.  We discussed the procedure at length including risks, anesthesia, intra-op procedures, recovery, bedrest, normal post-procedure expectations, and success rates.  I answered a few remaining questions. Ms. Nguyen verbalized understanding and she is ready to proceed.      Pt is sensitive to Band-Aid adhesive

## 2024-08-19 ENCOUNTER — PREP FOR SURGERY (OUTPATIENT)
Dept: OTHER | Facility: HOSPITAL | Age: 69
End: 2024-08-19
Payer: MEDICARE

## 2024-08-19 DIAGNOSIS — I48.0 PAROXYSMAL ATRIAL FIBRILLATION: Primary | ICD-10-CM

## 2024-08-19 RX ORDER — SODIUM CHLORIDE 0.9 % (FLUSH) 0.9 %
10 SYRINGE (ML) INJECTION AS NEEDED
OUTPATIENT
Start: 2024-08-19

## 2024-08-19 RX ORDER — SODIUM CHLORIDE 0.9 % (FLUSH) 0.9 %
10 SYRINGE (ML) INJECTION EVERY 12 HOURS SCHEDULED
OUTPATIENT
Start: 2024-08-19

## 2024-08-19 RX ORDER — NITROGLYCERIN 0.4 MG/1
0.4 TABLET SUBLINGUAL
OUTPATIENT
Start: 2024-08-19

## 2024-08-19 RX ORDER — ONDANSETRON 2 MG/ML
4 INJECTION INTRAMUSCULAR; INTRAVENOUS EVERY 6 HOURS PRN
OUTPATIENT
Start: 2024-08-19

## 2024-08-19 RX ORDER — SODIUM CHLORIDE 9 MG/ML
40 INJECTION, SOLUTION INTRAVENOUS AS NEEDED
OUTPATIENT
Start: 2024-08-19

## 2024-08-19 RX ORDER — ACETAMINOPHEN 325 MG/1
650 TABLET ORAL EVERY 4 HOURS PRN
OUTPATIENT
Start: 2024-08-19

## 2024-08-20 DIAGNOSIS — I48.0 PAROXYSMAL ATRIAL FIBRILLATION: Primary | ICD-10-CM

## 2024-08-21 ENCOUNTER — TELEPHONE (OUTPATIENT)
Dept: SLEEP MEDICINE | Facility: CLINIC | Age: 69
End: 2024-08-21

## 2024-08-27 ENCOUNTER — PRE-ADMISSION TESTING (OUTPATIENT)
Dept: PREADMISSION TESTING | Facility: HOSPITAL | Age: 69
End: 2024-08-27
Payer: MEDICARE

## 2024-08-27 ENCOUNTER — HOSPITAL ENCOUNTER (OUTPATIENT)
Dept: CT IMAGING | Facility: HOSPITAL | Age: 69
Discharge: HOME | End: 2024-08-27
Payer: MEDICARE

## 2024-08-27 DIAGNOSIS — I48.0 PAROXYSMAL ATRIAL FIBRILLATION: ICD-10-CM

## 2024-08-27 LAB
ANION GAP SERPL CALCULATED.3IONS-SCNC: 10 MMOL/L (ref 5–15)
BUN SERPL-MCNC: 13 MG/DL (ref 8–23)
BUN/CREAT SERPL: 22 (ref 7–25)
CALCIUM SPEC-SCNC: 9.1 MG/DL (ref 8.6–10.5)
CHLORIDE SERPL-SCNC: 106 MMOL/L (ref 98–107)
CO2 SERPL-SCNC: 26 MMOL/L (ref 22–29)
CREAT SERPL-MCNC: 0.59 MG/DL (ref 0.57–1)
DEPRECATED RDW RBC AUTO: 47.1 FL (ref 37–54)
EGFRCR SERPLBLD CKD-EPI 2021: 96.5 ML/MIN/1.73
ERYTHROCYTE [DISTWIDTH] IN BLOOD BY AUTOMATED COUNT: 13.8 % (ref 12.3–15.4)
GLUCOSE SERPL-MCNC: 95 MG/DL (ref 65–99)
HCT VFR BLD AUTO: 44.1 % (ref 34–46.6)
HGB BLD-MCNC: 14.2 G/DL (ref 12–15.9)
MCH RBC QN AUTO: 29.6 PG (ref 26.6–33)
MCHC RBC AUTO-ENTMCNC: 32.2 G/DL (ref 31.5–35.7)
MCV RBC AUTO: 91.9 FL (ref 79–97)
PLATELET # BLD AUTO: 303 10*3/MM3 (ref 140–450)
PMV BLD AUTO: 10.5 FL (ref 6–12)
POTASSIUM SERPL-SCNC: 3.7 MMOL/L (ref 3.5–5.2)
RBC # BLD AUTO: 4.8 10*6/MM3 (ref 3.77–5.28)
SODIUM SERPL-SCNC: 142 MMOL/L (ref 136–145)
TSH SERPL DL<=0.05 MIU/L-ACNC: 1.7 UIU/ML (ref 0.27–4.2)
WBC NRBC COR # BLD AUTO: 7.27 10*3/MM3 (ref 3.4–10.8)

## 2024-08-27 PROCEDURE — 85027 COMPLETE CBC AUTOMATED: CPT

## 2024-08-27 PROCEDURE — 84443 ASSAY THYROID STIM HORMONE: CPT

## 2024-08-27 PROCEDURE — 80048 BASIC METABOLIC PNL TOTAL CA: CPT

## 2024-08-27 PROCEDURE — 36415 COLL VENOUS BLD VENIPUNCTURE: CPT

## 2024-08-27 PROCEDURE — 71275 CT ANGIOGRAPHY CHEST: CPT

## 2024-08-27 PROCEDURE — 25510000001 IOPAMIDOL PER 1 ML: Performed by: INTERNAL MEDICINE

## 2024-08-27 RX ORDER — OMEGA-3 FATTY ACIDS/FISH OIL 300-1000MG
1 CAPSULE ORAL DAILY
COMMUNITY

## 2024-08-27 RX ORDER — IOPAMIDOL 755 MG/ML
80 INJECTION, SOLUTION INTRAVASCULAR
Status: COMPLETED | OUTPATIENT
Start: 2024-08-27 | End: 2024-08-27

## 2024-08-27 RX ORDER — ANTIOX #8/OM3/DHA/EPA/LUT/ZEAX 250-2.5 MG
1 CAPSULE ORAL 2 TIMES DAILY
COMMUNITY

## 2024-08-27 RX ORDER — GLUCOSAMINE/D3/BOSWELLIA SERRA 1500MG-400
1 TABLET ORAL DAILY
COMMUNITY

## 2024-08-27 RX ORDER — THIAMINE HCL 100 MG
1 TABLET ORAL DAILY
COMMUNITY

## 2024-08-27 RX ADMIN — IOPAMIDOL 80 ML: 755 INJECTION, SOLUTION INTRAVENOUS at 09:42

## 2024-08-27 NOTE — PAT
An arrival time for procedure was not provided during PAT visit. If patient had any questions or concerns about their arrival time, they were instructed to contact their surgeon/physician.  Additionally, if the patient referred to an arrival time that was acquired from their my chart account, patient was encouraged to verify that time with their surgeon/physician. Arrival times are NOT provided in Pre Admission Testing Department.    Patient denies any current skin issues.     Patient directed to Radiology Department for CT after Pre Admission Testing Appointment.     PATIENT STATES SHE WAS INSTRUCTED BY DR DANG'S OFFICE TO HOLD ALL MEDS AM OF SURGERY. LAST DOSE OF ELIQUIS TO BE 9/3/24 PM DOSE.

## 2024-08-27 NOTE — DISCHARGE INSTRUCTIONS
Dear Patient,    Do NOT eat, drink, or smoke after midnight the night before your procedure.   Take your medications as instructed by your doctor.    Glasses and jewelry may be worn, but dentures must be removed prior to your procedure.    Leave any items you consider valuable at home.      MORNING of your Procedure, please bring the following:     -Photo ID and insurance card(s)    -ALL medications in their ORIGINAL CONTAINERS or current medication list.    -Co-pay and/or deductible required by your insurance   -Copy of living will or power of  document (if not brought to Pre-Admission Testing department)   -CPAP mask and tubing, not your machine (if applicable)   -Relaxation aids (music, books, magazines)   -Skin Prep Instruction Sheet (if applicable)       Check in is on the 2nd floor of the [facility].  Your procedure will be performed in the cath lab or EP lab.  During your procedure, your family will wait in the cath lab waiting area where you checked in.      Need to make arrangements for transportation prior to discharge.    Educational handout related to procedure was given in Pre-Admission testing.  The educational handout is for informational purposes only.  If you have any questions about your procedure, please speak with your physician.      Please note:  If you are scheduled to have one of the following procedures: Pulmonary Vein Ablation, Lead Extraction, MitraClip, Cerebral Coilings or Embolization, please let your family know that after your procedure you will be going to recovery unit on the 2nd floor of the [facility].  When the physician is finished speaking with your family after your procedure is completed, your family will be directed or escorted to the surgery waiting area in the [facility].  This is where your family will wait until you are given a room assignment and then your family will be directed to the appropriate unit.

## 2024-09-03 ENCOUNTER — ANESTHESIA EVENT (OUTPATIENT)
Dept: CARDIOLOGY | Facility: HOSPITAL | Age: 69
End: 2024-09-03
Payer: MEDICARE

## 2024-09-04 ENCOUNTER — HOSPITAL ENCOUNTER (OUTPATIENT)
Facility: HOSPITAL | Age: 69
Setting detail: HOSPITAL OUTPATIENT SURGERY
Discharge: HOME | End: 2024-09-04
Attending: INTERNAL MEDICINE | Admitting: INTERNAL MEDICINE
Payer: MEDICARE

## 2024-09-04 ENCOUNTER — ANESTHESIA (OUTPATIENT)
Dept: CARDIOLOGY | Facility: HOSPITAL | Age: 69
End: 2024-09-04
Payer: MEDICARE

## 2024-09-04 VITALS
BODY MASS INDEX: 31.11 KG/M2 | TEMPERATURE: 97.2 F | SYSTOLIC BLOOD PRESSURE: 116 MMHG | HEART RATE: 59 BPM | WEIGHT: 186.73 LBS | HEIGHT: 65 IN | RESPIRATION RATE: 12 BRPM | OXYGEN SATURATION: 94 % | DIASTOLIC BLOOD PRESSURE: 67 MMHG

## 2024-09-04 DIAGNOSIS — I48.0 PAROXYSMAL ATRIAL FIBRILLATION: ICD-10-CM

## 2024-09-04 LAB
ACT BLD: 354 SECONDS (ref 82–152)
ACT BLD: 403 SECONDS (ref 82–152)

## 2024-09-04 PROCEDURE — C1760 CLOSURE DEV, VASC: HCPCS | Performed by: INTERNAL MEDICINE

## 2024-09-04 PROCEDURE — C1733 CATH, EP, OTHR THAN COOL-TIP: HCPCS | Performed by: INTERNAL MEDICINE

## 2024-09-04 PROCEDURE — 25010000002 BUPIVACAINE 0.5 % SOLUTION: Performed by: INTERNAL MEDICINE

## 2024-09-04 PROCEDURE — 25010000002 HEPARIN (PORCINE) PER 1000 UNITS: Performed by: INTERNAL MEDICINE

## 2024-09-04 PROCEDURE — 25810000003 SODIUM CHLORIDE 0.9 % SOLUTION: Performed by: INTERNAL MEDICINE

## 2024-09-04 PROCEDURE — C1766 INTRO/SHEATH,STRBLE,NON-PEEL: HCPCS | Performed by: INTERNAL MEDICINE

## 2024-09-04 PROCEDURE — 25010000002 PHENYLEPHRINE 10 MG/ML SOLUTION 1 ML VIAL: Performed by: ANESTHESIOLOGY

## 2024-09-04 PROCEDURE — C1730 CATH, EP, 19 OR FEW ELECT: HCPCS | Performed by: INTERNAL MEDICINE

## 2024-09-04 PROCEDURE — 25810000003 SODIUM CHLORIDE 0.9 % SOLUTION: Performed by: NURSE ANESTHETIST, CERTIFIED REGISTERED

## 2024-09-04 PROCEDURE — C1894 INTRO/SHEATH, NON-LASER: HCPCS | Performed by: INTERNAL MEDICINE

## 2024-09-04 PROCEDURE — 25010000002 GLYCOPYRROLATE 1 MG/5ML SOLUTION: Performed by: ANESTHESIOLOGY

## 2024-09-04 PROCEDURE — C1759 CATH, INTRA ECHOCARDIOGRAPHY: HCPCS | Performed by: INTERNAL MEDICINE

## 2024-09-04 PROCEDURE — 93657 TX L/R ATRIAL FIB ADDL: CPT | Performed by: INTERNAL MEDICINE

## 2024-09-04 PROCEDURE — 25010000002 PROTAMINE SULFATE PER 10 MG: Performed by: INTERNAL MEDICINE

## 2024-09-04 PROCEDURE — 25810000003 SODIUM CHLORIDE 0.9 % SOLUTION 250 ML FLEX CONT: Performed by: ANESTHESIOLOGY

## 2024-09-04 PROCEDURE — 93622 COMP EP EVAL L VENTR PAC&REC: CPT | Performed by: INTERNAL MEDICINE

## 2024-09-04 PROCEDURE — 85347 COAGULATION TIME ACTIVATED: CPT

## 2024-09-04 PROCEDURE — C1732 CATH, EP, DIAG/ABL, 3D/VECT: HCPCS | Performed by: INTERNAL MEDICINE

## 2024-09-04 PROCEDURE — 25010000002 ONDANSETRON PER 1 MG: Performed by: ANESTHESIOLOGY

## 2024-09-04 PROCEDURE — 93656 COMPRE EP EVAL ABLTJ ATR FIB: CPT | Performed by: INTERNAL MEDICINE

## 2024-09-04 PROCEDURE — 25010000002 PROPOFOL 10 MG/ML EMULSION: Performed by: NURSE ANESTHETIST, CERTIFIED REGISTERED

## 2024-09-04 PROCEDURE — 25010000002 DEXAMETHASONE PER 1 MG: Performed by: NURSE ANESTHETIST, CERTIFIED REGISTERED

## 2024-09-04 PROCEDURE — 25010000002 SUGAMMADEX 200 MG/2ML SOLUTION: Performed by: ANESTHESIOLOGY

## 2024-09-04 PROCEDURE — 25010000002 LIDOCAINE 1 % SOLUTION: Performed by: INTERNAL MEDICINE

## 2024-09-04 RX ORDER — BUPIVACAINE HYDROCHLORIDE 5 MG/ML
INJECTION, SOLUTION PERINEURAL
Status: DISCONTINUED | OUTPATIENT
Start: 2024-09-04 | End: 2024-09-04 | Stop reason: HOSPADM

## 2024-09-04 RX ORDER — NITROGLYCERIN 0.4 MG/1
0.4 TABLET SUBLINGUAL
Status: DISCONTINUED | OUTPATIENT
Start: 2024-09-04 | End: 2024-09-04 | Stop reason: HOSPADM

## 2024-09-04 RX ORDER — SODIUM CHLORIDE 0.9 % (FLUSH) 0.9 %
10 SYRINGE (ML) INJECTION EVERY 12 HOURS SCHEDULED
Status: DISCONTINUED | OUTPATIENT
Start: 2024-09-04 | End: 2024-09-04 | Stop reason: HOSPADM

## 2024-09-04 RX ORDER — ACETAMINOPHEN 325 MG/1
650 TABLET ORAL EVERY 4 HOURS PRN
Status: DISCONTINUED | OUTPATIENT
Start: 2024-09-04 | End: 2024-09-04 | Stop reason: HOSPADM

## 2024-09-04 RX ORDER — HYDROMORPHONE HYDROCHLORIDE 1 MG/ML
0.5 INJECTION, SOLUTION INTRAMUSCULAR; INTRAVENOUS; SUBCUTANEOUS
Status: DISCONTINUED | OUTPATIENT
Start: 2024-09-04 | End: 2024-09-04 | Stop reason: HOSPADM

## 2024-09-04 RX ORDER — FENTANYL CITRATE 50 UG/ML
50 INJECTION, SOLUTION INTRAMUSCULAR; INTRAVENOUS
Status: DISCONTINUED | OUTPATIENT
Start: 2024-09-04 | End: 2024-09-04 | Stop reason: HOSPADM

## 2024-09-04 RX ORDER — SODIUM CHLORIDE 9 MG/ML
INJECTION, SOLUTION INTRAVENOUS
Status: DISCONTINUED | OUTPATIENT
Start: 2024-09-04 | End: 2024-09-04 | Stop reason: HOSPADM

## 2024-09-04 RX ORDER — SODIUM CHLORIDE 9 MG/ML
40 INJECTION, SOLUTION INTRAVENOUS AS NEEDED
Status: DISCONTINUED | OUTPATIENT
Start: 2024-09-04 | End: 2024-09-04 | Stop reason: HOSPADM

## 2024-09-04 RX ORDER — PROPOFOL 10 MG/ML
VIAL (ML) INTRAVENOUS AS NEEDED
Status: DISCONTINUED | OUTPATIENT
Start: 2024-09-04 | End: 2024-09-04 | Stop reason: SURG

## 2024-09-04 RX ORDER — SODIUM CHLORIDE, SODIUM LACTATE, POTASSIUM CHLORIDE, CALCIUM CHLORIDE 600; 310; 30; 20 MG/100ML; MG/100ML; MG/100ML; MG/100ML
9 INJECTION, SOLUTION INTRAVENOUS CONTINUOUS
Status: DISCONTINUED | OUTPATIENT
Start: 2024-09-04 | End: 2024-09-04 | Stop reason: HOSPADM

## 2024-09-04 RX ORDER — SODIUM CHLORIDE 9 MG/ML
INJECTION, SOLUTION INTRAVENOUS CONTINUOUS PRN
Status: DISCONTINUED | OUTPATIENT
Start: 2024-09-04 | End: 2024-09-04 | Stop reason: SURG

## 2024-09-04 RX ORDER — LIDOCAINE HYDROCHLORIDE 10 MG/ML
INJECTION, SOLUTION INFILTRATION; PERINEURAL
Status: DISCONTINUED | OUTPATIENT
Start: 2024-09-04 | End: 2024-09-04 | Stop reason: HOSPADM

## 2024-09-04 RX ORDER — DROPERIDOL 2.5 MG/ML
0.62 INJECTION, SOLUTION INTRAMUSCULAR; INTRAVENOUS ONCE AS NEEDED
Status: DISCONTINUED | OUTPATIENT
Start: 2024-09-04 | End: 2024-09-04 | Stop reason: HOSPADM

## 2024-09-04 RX ORDER — MIDAZOLAM HYDROCHLORIDE 1 MG/ML
0.5 INJECTION INTRAMUSCULAR; INTRAVENOUS
Status: DISCONTINUED | OUTPATIENT
Start: 2024-09-04 | End: 2024-09-04 | Stop reason: HOSPADM

## 2024-09-04 RX ORDER — HEPARIN SODIUM 1000 [USP'U]/ML
INJECTION, SOLUTION INTRAVENOUS; SUBCUTANEOUS
Status: DISCONTINUED | OUTPATIENT
Start: 2024-09-04 | End: 2024-09-04 | Stop reason: HOSPADM

## 2024-09-04 RX ORDER — ONDANSETRON 2 MG/ML
4 INJECTION INTRAMUSCULAR; INTRAVENOUS EVERY 6 HOURS PRN
Status: DISCONTINUED | OUTPATIENT
Start: 2024-09-04 | End: 2024-09-04 | Stop reason: HOSPADM

## 2024-09-04 RX ORDER — FAMOTIDINE 10 MG/ML
20 INJECTION, SOLUTION INTRAVENOUS ONCE
Status: DISCONTINUED | OUTPATIENT
Start: 2024-09-04 | End: 2024-09-04 | Stop reason: HOSPADM

## 2024-09-04 RX ORDER — SODIUM CHLORIDE 0.9 % (FLUSH) 0.9 %
10 SYRINGE (ML) INJECTION AS NEEDED
Status: DISCONTINUED | OUTPATIENT
Start: 2024-09-04 | End: 2024-09-04 | Stop reason: HOSPADM

## 2024-09-04 RX ORDER — ONDANSETRON 2 MG/ML
INJECTION INTRAMUSCULAR; INTRAVENOUS AS NEEDED
Status: DISCONTINUED | OUTPATIENT
Start: 2024-09-04 | End: 2024-09-04 | Stop reason: SURG

## 2024-09-04 RX ORDER — GLYCOPYRROLATE 0.2 MG/ML
INJECTION INTRAMUSCULAR; INTRAVENOUS AS NEEDED
Status: DISCONTINUED | OUTPATIENT
Start: 2024-09-04 | End: 2024-09-04 | Stop reason: SURG

## 2024-09-04 RX ORDER — ROCURONIUM BROMIDE 10 MG/ML
INJECTION, SOLUTION INTRAVENOUS AS NEEDED
Status: DISCONTINUED | OUTPATIENT
Start: 2024-09-04 | End: 2024-09-04 | Stop reason: SURG

## 2024-09-04 RX ORDER — PROTAMINE SULFATE 10 MG/ML
INJECTION, SOLUTION INTRAVENOUS
Status: DISCONTINUED | OUTPATIENT
Start: 2024-09-04 | End: 2024-09-04 | Stop reason: HOSPADM

## 2024-09-04 RX ORDER — FAMOTIDINE 20 MG/1
20 TABLET, FILM COATED ORAL ONCE
Status: COMPLETED | OUTPATIENT
Start: 2024-09-04 | End: 2024-09-04

## 2024-09-04 RX ORDER — DEXAMETHASONE SODIUM PHOSPHATE 10 MG/ML
INJECTION INTRAMUSCULAR; INTRAVENOUS AS NEEDED
Status: DISCONTINUED | OUTPATIENT
Start: 2024-09-04 | End: 2024-09-04 | Stop reason: SURG

## 2024-09-04 RX ADMIN — SUGAMMADEX 200 MG: 100 INJECTION, SOLUTION INTRAVENOUS at 16:49

## 2024-09-04 RX ADMIN — DEXAMETHASONE SODIUM PHOSPHATE 4 MG: 10 INJECTION INTRAMUSCULAR; INTRAVENOUS at 15:50

## 2024-09-04 RX ADMIN — SODIUM CHLORIDE: 9 INJECTION, SOLUTION INTRAVENOUS at 15:30

## 2024-09-04 RX ADMIN — PHENYLEPHRINE HYDROCHLORIDE 100 MCG: 10 INJECTION INTRAVENOUS at 16:39

## 2024-09-04 RX ADMIN — ONDANSETRON 4 MG: 2 INJECTION INTRAMUSCULAR; INTRAVENOUS at 16:49

## 2024-09-04 RX ADMIN — PHENYLEPHRINE HYDROCHLORIDE 100 MCG: 10 INJECTION INTRAVENOUS at 16:44

## 2024-09-04 RX ADMIN — ROCURONIUM BROMIDE 50 MG: 10 SOLUTION INTRAVENOUS at 15:38

## 2024-09-04 RX ADMIN — GLYCOPYRROLATE 0.2 MG: 0.2 INJECTION INTRAMUSCULAR; INTRAVENOUS at 16:38

## 2024-09-04 RX ADMIN — PROPOFOL 150 MG: 10 INJECTION, EMULSION INTRAVENOUS at 15:37

## 2024-09-04 RX ADMIN — FAMOTIDINE 20 MG: 20 TABLET, FILM COATED ORAL at 12:22

## 2024-09-04 RX ADMIN — ROCURONIUM BROMIDE 20 MG: 10 SOLUTION INTRAVENOUS at 16:10

## 2024-09-04 NOTE — INTERVAL H&P NOTE
H&P reviewed. The patient was examined and there are no changes to the H&P.      Vitals:    09/04/24 1007 09/04/24 1009   BP: 140/62 130/77   BP Location: Right arm Left arm   Patient Position: Lying Lying   Pulse: 54    Resp: 16    Temp: 97.7 °F (36.5 °C)    TempSrc: Tympanic    SpO2: 98%      Lab Results   Component Value Date    GLUCOSE 95 08/27/2024    CALCIUM 9.1 08/27/2024     08/27/2024    K 3.7 08/27/2024    CO2 26.0 08/27/2024     08/27/2024    BUN 13 08/27/2024    CREATININE 0.59 08/27/2024    EGFR 96.5 08/27/2024    BCR 22.0 08/27/2024    ANIONGAP 10.0 08/27/2024     Lab Results   Component Value Date    WBC 7.27 08/27/2024    HGB 14.2 08/27/2024    HCT 44.1 08/27/2024    MCV 91.9 08/27/2024     08/27/2024     Patient is here today for pulmonary vein ablation with general anesthesia.  Patient has been anticoagulated with Eliquis for the last 30 days and denies any missed doses.  Procedure, risks, and alternatives have been discussed with the patient and she is agreeable to proceed.  Further recommendations to follow.    Electronically signed by DEDE Nichole, 09/04/24, 10:31 AM EDT.    
negative...

## 2024-09-04 NOTE — ANESTHESIA PREPROCEDURE EVALUATION
Anesthesia Evaluation     Patient summary reviewed and Nursing notes reviewed   no history of anesthetic complications:   NPO Solid Status: > 8 hours  NPO Liquid Status: > 2 hours           Airway   Mallampati: II  TM distance: >3 FB  Neck ROM: full  Anterior and Possible difficult intubation  Dental          Pulmonary - normal exam    breath sounds clear to auscultation  (+) asthma (very mild - more attributed to afib; no longer needs inhaler),  Cardiovascular - normal exam  Exercise tolerance: good (4-7 METS)    PT is on anticoagulation therapy  Patient on routine beta blocker  Rhythm: regular  Rate: normal    (+) hypertension, dysrhythmias Atrial Fib, hyperlipidemia    ROS comment: ECHO 08/2024: EF 55-60%    Neuro/Psych- negative ROS  GI/Hepatic/Renal/Endo    (+) obesity, GERD    Musculoskeletal     Abdominal    Substance History - negative use     OB/GYN          Other   arthritis,     ROS/Med Hx Other: Eliquis    8/27/24: Hgb 14.2, K 3.7                  Anesthesia Plan    ASA 3     general     (Clearsight for BP morning)  intravenous induction     Anesthetic plan, risks, benefits, and alternatives have been provided, discussed and informed consent has been obtained with: patient.    Plan discussed with CRNA.        CODE STATUS:

## 2024-09-04 NOTE — ANESTHESIA POSTPROCEDURE EVALUATION
Patient: Kailey Nguyen    Procedure Summary       Date: 09/04/24 Room / Location: [facility] /  [facility]ASIVE LOC[facility]    Anesthesia Start: 1525 Anesthesia Stop: 1656    Procedure: Ablation atrial fibrillation w/ PFA & Ensite, CTA prior, Hold Eliquis morning of, DNS other meds. Diagnosis:       Paroxysmal atrial fibrillation      (afib)    Providers: Ko Miller MD Provider: Gail Goodrich DO    Anesthesia Type: general ASA Status: 3            Anesthesia Type: general    Vitals  No vitals data found for the desired time range.          Post Anesthesia Care and Evaluation    Patient location during evaluation: bedside  Patient participation: complete - patient participated  Level of consciousness: awake and alert  Pain management: adequate    Airway patency: patent  Anesthetic complications: No anesthetic complications  PONV Status: none  Cardiovascular status: hemodynamically stable and acceptable  Respiratory status: nonlabored ventilation, acceptable, nasal cannula and airway suctioned  Hydration status: acceptable    Comments: O n O2NC, breathing comfortably.  Report to EP RN at bedside. VSS.

## 2024-09-04 NOTE — ANESTHESIA PROCEDURE NOTES
Airway  Urgency: elective    Date/Time: 9/4/2024 3:40 PM  Airway not difficult    General Information and Staff    Patient location during procedure: OR  CRNA/CAA: Muna Calderon CRNA    Indications and Patient Condition  Indications for airway management: airway protection    Preoxygenated: yes  MILS not maintained throughout  Mask difficulty assessment: 1 - vent by mask    Final Airway Details  Final airway type: endotracheal airway      Successful airway: ETT  Cuffed: yes   Successful intubation technique: direct laryngoscopy  Endotracheal tube insertion site: oral  Blade: Raul  Blade size: 3  ETT size (mm): 7.0  Cormack-Lehane Classification: grade I - full view of glottis  Placement verified by: chest auscultation and capnometry   Cuff volume (mL): 8  Measured from: lips  ETT/EBT  to lips (cm): 22  Number of attempts at approach: 1  Assessment: lips, teeth, and gum same as pre-op and atraumatic intubation    Additional Comments  Negative epigastric sounds, Breath sound equal bilaterally with symmetric chest rise and fall.

## 2024-09-05 ENCOUNTER — CALL CENTER PROGRAMS (OUTPATIENT)
Dept: CALL CENTER | Facility: HOSPITAL | Age: 69
End: 2024-09-05
Payer: MEDICARE

## 2024-09-05 NOTE — OUTREACH NOTE
Call start 1220  Call end 1233    Pt reports she is already feeling better regarding her SOA, denies palpitations or chest pain.  Reports she greatly underestimated how sore her throat would be and is eating soft foods for a bit.      Pt will remove dressing after 5pm to shower per instructions.  Reviewed site care and activity instructions.    Pt is taking Eliquis and has stopped flecainide.    Appts in place; reviewed on AVS.    Pt aware to monitor and seek care for cardiac, stroke type s/s.

## 2024-09-05 NOTE — OUTREACH NOTE
PCI/Device Survey      Flowsheet Row Responses   Facility patient discharged from? [facility]   Procedure date 09/04/24   Procedure (if device, specify in description) Ablation  [Right femoral]   Performing MD Other (annotate)   Attempt successful? No  [attempted pt and ]   Unsuccessful attempts Attempt 1            NICHOLAS PACE - Registered Nurse

## 2024-09-05 NOTE — OUTREACH NOTE
PCI/Device Survey      Flowsheet Row Responses   Facility patient discharged from? [facility]   Procedure date 09/04/24   Procedure (if device, specify in description) Ablation  [Right femoral]   Performing MD Other (annotate)  [Dr. Miller]   Attempt successful? No   Unsuccessful attempts Attempt 2            NICHOLAS PACE - Registered Nurse

## 2024-10-03 ENCOUNTER — OFFICE VISIT (OUTPATIENT)
Dept: CARDIOLOGY | Facility: HOSPITAL | Age: 69
End: 2024-10-03
Payer: MEDICARE

## 2024-10-03 VITALS
SYSTOLIC BLOOD PRESSURE: 116 MMHG | OXYGEN SATURATION: 98 % | HEART RATE: 62 BPM | DIASTOLIC BLOOD PRESSURE: 65 MMHG | WEIGHT: 187 LBS | RESPIRATION RATE: 16 BRPM | HEIGHT: 65 IN | BODY MASS INDEX: 31.16 KG/M2

## 2024-10-03 DIAGNOSIS — I10 ESSENTIAL HYPERTENSION: ICD-10-CM

## 2024-10-03 DIAGNOSIS — I48.0 PAROXYSMAL ATRIAL FIBRILLATION: Primary | ICD-10-CM

## 2024-12-12 ENCOUNTER — OFFICE VISIT (OUTPATIENT)
Dept: CARDIOLOGY | Facility: CLINIC | Age: 69
End: 2024-12-12
Payer: MEDICARE

## 2024-12-12 VITALS
WEIGHT: 190.6 LBS | HEIGHT: 65 IN | HEART RATE: 53 BPM | SYSTOLIC BLOOD PRESSURE: 124 MMHG | BODY MASS INDEX: 31.75 KG/M2 | OXYGEN SATURATION: 94 % | DIASTOLIC BLOOD PRESSURE: 78 MMHG

## 2024-12-12 DIAGNOSIS — I48.0 PAROXYSMAL ATRIAL FIBRILLATION: Primary | Chronic | ICD-10-CM

## 2024-12-12 DIAGNOSIS — I10 ESSENTIAL HYPERTENSION: Chronic | ICD-10-CM

## 2024-12-12 NOTE — PROGRESS NOTES
Kailey Nguyen  [id number]  1955  [phone]      12/12/2024      [facility] CARDIOLOGY     Referring Provider: No ref. provider found     Keerthi Koenig PA  [address] [place] [address] [address] [place] [address]    Chief Complaint   Patient presents with    Paroxysmal atrial fibrillation       Problem List  Atrial fibrillation  [id number] (age, sex, HTN)  AAD: hx of Flecainide. Currently only on metoprolol  Monitor, 8/2024: A-fib/a flutter burden 22%  Echo, 8/2024: EF 56-60%  PVA, 9/4/24  Hypertension  Hyperlipidemia   Asthma  GERD  Hiatal hernia      History of Present Illness   Kailey Nguyen is a 69 y.o. female who presents to my electrophysiology clinic for follow up of AF/HTN s/p PVA in September. Since the procedure, she has been doing well. No recurrence of AF despite now being off of flecainide.      Outpatient Medications Marked as Taking for the 12/12/24 encounter (Office Visit) with Ko Miller MD   Medication Sig Dispense Refill    acetaminophen (TYLENOL) 500 MG tablet Take 2 tablets by mouth Every 6 (Six) Hours As Needed for Mild Pain or Moderate Pain. (Patient taking differently: Take 2 tablets by mouth Every 6 (Six) Hours As Needed for Mild Pain or Moderate Pain. USUALLY TAKES 2 AT PM) 30 tablet 0    Biotin 91830 MCG tablet Take 1 tablet by mouth Daily.      Calcium Citrate-Vitamin D3 (CITRACAL) 315-6.25 MG-MCG tablet tablet Take 1 tablet by mouth Daily.      Cetirizine HCl (ZyrTEC Allergy) 10 MG capsule Take 10 mg by mouth Daily.      Coenzyme Q10 200 MG capsule Take 200 mg by mouth Daily.      Diclofenac Sodium (VOLTAREN) 1 % gel gel Apply 4 g topically to the appropriate area as directed 4 (Four) Times a Day As Needed (PAIN). QHS FOR ARTHRITIC KNEES      Eliquis 5 MG tablet tablet Take 1 tablet by mouth 2 (Two) Times a Day. WILL HOLD AM OF SURGERY      esomeprazole (nexIUM) 20 MG capsule Take 1 capsule by mouth 2 (Two) Times a Day.      hydroCHLOROthiazide 12.5 MG tablet  "Take 1 tablet by mouth Daily.      Lysine HCl (L-Formula Lysine HCl) 500 MG tablet Take 1 tablet by mouth Daily.      metoprolol tartrate (LOPRESSOR) 25 MG tablet Take 0.5 tablets by mouth 2 (Two) Times a Day.      montelukast (SINGULAIR) 10 MG tablet Take 1 tablet by mouth Daily.      multivitamin tablet tablet Take 1 tablet by mouth Daily.      multivitamins-minerals (PRESERVISION AREDS 2) capsule capsule Take 1 capsule by mouth 2 (Two) Times a Day.      Omega 3 1000 MG capsule Take 1 capsule by mouth Daily.      simvastatin (ZOCOR) 20 MG tablet Take 1 tablet by mouth Daily.              Physical Exam  Vitals:    12/12/24 1133   BP: 124/78   Pulse: 53   SpO2: 94%   Weight: 86.5 kg (190 lb 9.6 oz)   Height: 165.1 cm (65\")     Body mass index is 31.72 kg/m².  Vitals and nursing note reviewed.   Constitutional:       Appearance: Healthy appearance.   HENT:      Head: Normocephalic and atraumatic.      Nose: Nose normal.   Neck:      Vascular: No JVD.   Pulmonary:      Effort: Pulmonary effort is normal.      Breath sounds: Normal breath sounds.   Cardiovascular:      PMI at left midclavicular line. Bradycardia present. Regular rhythm. Normal S1. Normal S2.       Murmurs: There is no murmur.      No gallop.    Edema:     Peripheral edema absent.   Skin:     General: Skin is warm and dry.   Neurological:      Mental Status: Oriented to person, place and time.   Psychiatric:         Behavior: Behavior normal.          Diagnostic Data  SB 53bpm    QRS 82  QTc 424    Lab Results   Component Value Date    GLUCOSE 95 08/27/2024    CALCIUM 9.1 08/27/2024     08/27/2024    K 3.7 08/27/2024    CO2 26.0 08/27/2024     08/27/2024    BUN 13 08/27/2024    CREATININE 0.59 08/27/2024    BCR 22.0 08/27/2024    ANIONGAP 10.0 08/27/2024     Lab Results   Component Value Date    WBC 7.27 08/27/2024    HGB 14.2 08/27/2024    HCT 44.1 08/27/2024    MCV 91.9 08/27/2024     08/27/2024     No results found for: " "\"INR\", \"PROTIME\"  Lab Results   Component Value Date    TSH 1.700 08/27/2024       I personally viewed and interpreted the patient's EKG/Telemetry/lab data    Kailey Nguyen  reports that she has never smoked. She has never been exposed to tobacco smoke. She has never used smokeless tobacco. I have educated her on the risk of diseases from using tobacco products such as cancer, COPD, and heart disease.       I spent 3  minutes counseling the patient.           ACP discussion was declined by the patient. Patient does not have an advance directive, declines further assistance.    Assessment and Plan  Diagnoses and all orders for this visit:    1. Paroxysmal atrial fibrillation (Primary)    2. Essential hypertension        Paroxysmal atrial fibrillation  -[id number] 3 (age, sex, HTN), anticoagulated with Eliquis  -Currently on metoprolol. Previously was on flecainide but stopped after AF ablation  -Recent monitor showed afib burden 22%  -PVA, 9/4/24  -follow up per protocol    Hypertension  -Well-controlled, continue current medication regimen      Follow-Up  Return for Next scheduled follow up.      Thank you for allowing me to participate in the care of your patient. Please to not hesitate to contact me with additional questions or concerns.     Ko Miller MD [facility]  Cardiac Electrophysiologist  [facility] / [facility]      "

## 2025-03-13 ENCOUNTER — OFFICE VISIT (OUTPATIENT)
Dept: CARDIOLOGY | Facility: CLINIC | Age: 70
End: 2025-03-13
Payer: MEDICARE

## 2025-03-13 VITALS
SYSTOLIC BLOOD PRESSURE: 118 MMHG | HEART RATE: 69 BPM | BODY MASS INDEX: 30.7 KG/M2 | WEIGHT: 191 LBS | DIASTOLIC BLOOD PRESSURE: 62 MMHG | OXYGEN SATURATION: 94 % | HEIGHT: 66 IN

## 2025-03-13 DIAGNOSIS — I48.0 PAROXYSMAL ATRIAL FIBRILLATION: Primary | Chronic | ICD-10-CM

## 2025-03-13 DIAGNOSIS — I10 ESSENTIAL HYPERTENSION: Chronic | ICD-10-CM

## 2025-03-13 NOTE — PROGRESS NOTES
Kailey Nguyen  [id number]  1955  [phone]      [facility] CARDIOLOGY     Referring Provider: No ref. provider found     Keerthi Koenig, PA  [address] [place] [address]    Chief Complaint   Patient presents with    Paroxysmal atrial fibrillation       Problem List  Atrial fibrillation  [id number] (age, sex, HTN)  AAD: hx of Flecainide. Currently only on metoprolol  Monitor, 8/2024: A-fib/a flutter burden 22%  Echo, 8/2024: EF 56-60%  PVA, 9/4/24  Hypertension  Hyperlipidemia   Asthma  GERD  Hiatal hernia      History of Present Illness   Kailey Nguyen is a 69 y.o. female who presents to my electrophysiology clinic for follow up of AF/HTN s/p PVA in September. Since the procedure, she has been doing well. No recurrence of AF despite now being off of flecainide.      UPDATE 3/2025: overall doing well. No recurrence of AF recently. Had two episodes during her recovery phase, but none since 3 months check up. No further fatigue or exertional SOB. No chest pain/syncope.     Outpatient Medications Marked as Taking for the 3/13/25 encounter (Office Visit) with Ko Miller MD   Medication Sig Dispense Refill    acetaminophen (TYLENOL) 500 MG tablet Take 2 tablets by mouth Every 6 (Six) Hours As Needed for Mild Pain or Moderate Pain. (Patient taking differently: Take 2 tablets by mouth Every 6 (Six) Hours As Needed for Mild Pain or Moderate Pain. USUALLY TAKES 2 AT PM) 30 tablet 0    Biotin 95539 MCG tablet Take 1 tablet by mouth Daily.      Calcium Citrate-Vitamin D3 (CITRACAL) 315-6.25 MG-MCG tablet tablet Take 1 tablet by mouth Daily.      Cetirizine HCl (ZyrTEC Allergy) 10 MG capsule Take 10 mg by mouth Daily.      Coenzyme Q10 200 MG capsule Take 200 mg by mouth Daily.      Diclofenac Sodium (VOLTAREN) 1 % gel gel Apply 4 g topically to the appropriate area as directed 4 (Four) Times a Day As Needed (PAIN). QHS FOR ARTHRITIC KNEES      Eliquis 5 MG tablet tablet Take 1  "tablet by mouth 2 (Two) Times a Day. WILL HOLD AM OF SURGERY      esomeprazole (nexIUM) 20 MG capsule Take 1 capsule by mouth 2 (Two) Times a Day.      hydroCHLOROthiazide 12.5 MG tablet Take 1 tablet by mouth Daily.      Lysine HCl (L-Formula Lysine HCl) 500 MG tablet Take 1 tablet by mouth Daily.      metoprolol tartrate (LOPRESSOR) 25 MG tablet Take 0.5 tablets by mouth 2 (Two) Times a Day.      montelukast (SINGULAIR) 10 MG tablet Take 1 tablet by mouth Daily.      multivitamin tablet tablet Take 1 tablet by mouth Daily.      multivitamins-minerals (PRESERVISION AREDS 2) capsule capsule Take 1 capsule by mouth 2 (Two) Times a Day.      Omega 3 1000 MG capsule Take 1 capsule by mouth Daily.      simvastatin (ZOCOR) 20 MG tablet Take 1 tablet by mouth Daily.              Physical Exam  Vitals:    03/13/25 1042   BP: 118/62   BP Location: Left arm   Patient Position: Sitting   Pulse: 69   SpO2: 94%   Weight: 86.6 kg (191 lb)   Height: 167.6 cm (66\")     Body mass index is 30.83 kg/m².  Vitals and nursing note reviewed.   Constitutional:       Appearance: Healthy appearance.   HENT:      Head: Normocephalic and atraumatic.      Nose: Nose normal.   Neck:      Vascular: No JVD.   Pulmonary:      Effort: Pulmonary effort is normal.      Breath sounds: Normal breath sounds.   Cardiovascular:      PMI at left midclavicular line. Bradycardia present. Regular rhythm. Normal S1. Normal S2.       Murmurs: There is no murmur.      No gallop.    Edema:     Peripheral edema absent.   Skin:     General: Skin is warm and dry.   Neurological:      Mental Status: Oriented to person, place and time.   Psychiatric:         Behavior: Behavior normal.          Diagnostic Data    NSR 69 bpm   QRS 78 QTc 420    Lab Results   Component Value Date    GLUCOSE 95 08/27/2024    CALCIUM 9.1 08/27/2024     08/27/2024    K 3.7 08/27/2024    CO2 26.0 08/27/2024     08/27/2024    BUN 13 08/27/2024    CREATININE 0.59 08/27/2024 " "   BCR 22.0 08/27/2024    ANIONGAP 10.0 08/27/2024     Lab Results   Component Value Date    WBC 7.27 08/27/2024    HGB 14.2 08/27/2024    HCT 44.1 08/27/2024    MCV 91.9 08/27/2024     08/27/2024     No results found for: \"INR\", \"PROTIME\"  Lab Results   Component Value Date    TSH 1.700 08/27/2024       I personally viewed and interpreted the patient's EKG/Telemetry/lab data    Kailey Nguyen  reports that she has never smoked. She has never been exposed to tobacco smoke. She has never used smokeless tobacco. I have educated her on the risk of diseases from using tobacco products such as cancer, COPD, and heart disease.     I spent 3  minutes counseling the patient.    ACP discussion was declined by the patient. Patient does not have an advance directive, declines further assistance.    Assessment and Plan  Diagnoses and all orders for this visit:    1. Paroxysmal atrial fibrillation (Primary)    2. Essential hypertension        Paroxysmal atrial fibrillation  -[id number] 3 (age, sex, HTN), anticoagulated with Eliquis  -Recent monitor showed afib burden 22%  -PVA, 9/4/24  -Currently on metoprolol. Previously was on flecainide but stopped after AF ablation  -doing well  -follow up per protocol    Hypertension  -Well-controlled, continue current medication regimen      Follow-Up  Return for Next scheduled follow up.      Thank you for allowing me to participate in the care of your patient. Please to not hesitate to contact me with additional questions or concerns.     Ko Miller MD [facility]  Cardiac Electrophysiologist  [facility] / [facility]      "

## (undated) DEVICE — ST INF PRI SMRTSTE 20DRP 2VLV 24ML 117

## (undated) DEVICE — KT ELECTRD EP SURF ENSITE/X ADHS/PTCH 1P/U NS

## (undated) DEVICE — PINNACLE INTRODUCER SHEATH: Brand: PINNACLE

## (undated) DEVICE — ST EXT IV SMARTSITE PINCH/CLMP 5ML 46CM

## (undated) DEVICE — Device: Brand: WEBSTER CS

## (undated) DEVICE — CATH ADVISOR HD GRID MAP SENSR ENABLED

## (undated) DEVICE — CATHETER CONNECTION CABLE: Brand: FARASTAR™

## (undated) DEVICE — STEERABLE SHEATH CLEAR: Brand: FARADRIVE™

## (undated) DEVICE — ELECTRD RETRN/GRND MEGADYNE SGL/PLT W/CORD 9FT DISP

## (undated) DEVICE — STERILE (15.2 TAPERED TO 7.6 X 183CM) POLYETHYLENE ACCORDION-FOLDED COVER FOR USE WITH SIEMENS ACUNAV ULTRASOUND CATHETER FAMILY CONNECTOR: Brand: SWIFTLINK TRANSDUCER COVER

## (undated) DEVICE — ST EXT IV SMRTSTE 2VLV FIX M LL 6ML 41

## (undated) DEVICE — PERCLOSE™ PROSTYLE™ SUTURE-MEDIATED CLOSURE AND REPAIR SYSTEM: Brand: PERCLOSE™ PROSTYLE™

## (undated) DEVICE — LEX ELECTRO PHYSIOLOGY: Brand: MEDLINE INDUSTRIES, INC.

## (undated) DEVICE — SET PRIMARY GRVTY 10DP MALE LL 104IN

## (undated) DEVICE — DRSNG SURESITE123 4X4.8IN

## (undated) DEVICE — CATH ULTRASND ECHO ACUNAV FOR ACUSON 8F 90CM

## (undated) DEVICE — SOL NACL 0.9PCT 1000ML

## (undated) DEVICE — 1 X VERSACROSS CONNECT TRANSSEPTAL DILATOR;  1 X VERSACROSS RF WIRE (INCLUDING 1 X CONNECTOR CABLE (SINGLE USE)): Brand: VERSACROSS CONNECT ACCESS SOLUTION FOR FARADRIVE

## (undated) DEVICE — DECANT BG O JET

## (undated) DEVICE — ADULT, W/LG. BACK PAD, RADIOTRANSPARENT ELEMENT AND LEAD WIRE COMPATIBLE W/: Brand: DEFIBRILLATION ELECTRODES

## (undated) DEVICE — Device: Brand: MEDEX

## (undated) DEVICE — PULSED FIELD ABLATION CATHETER: Brand: FARAWAVE™